# Patient Record
Sex: MALE | Race: WHITE | NOT HISPANIC OR LATINO | Employment: FULL TIME | URBAN - METROPOLITAN AREA
[De-identification: names, ages, dates, MRNs, and addresses within clinical notes are randomized per-mention and may not be internally consistent; named-entity substitution may affect disease eponyms.]

---

## 2019-06-14 ENCOUNTER — EVALUATION (OUTPATIENT)
Dept: PHYSICAL THERAPY | Facility: CLINIC | Age: 24
End: 2019-06-14
Payer: COMMERCIAL

## 2019-06-14 DIAGNOSIS — S83.282D TEAR OF LATERAL MENISCUS OF LEFT KNEE, CURRENT, UNSPECIFIED TEAR TYPE, SUBSEQUENT ENCOUNTER: Primary | ICD-10-CM

## 2019-06-14 PROCEDURE — 97110 THERAPEUTIC EXERCISES: CPT

## 2019-06-14 PROCEDURE — 97161 PT EVAL LOW COMPLEX 20 MIN: CPT

## 2019-06-17 ENCOUNTER — OFFICE VISIT (OUTPATIENT)
Dept: PHYSICAL THERAPY | Facility: CLINIC | Age: 24
End: 2019-06-17
Payer: COMMERCIAL

## 2019-06-17 DIAGNOSIS — S83.282D TEAR OF LATERAL MENISCUS OF LEFT KNEE, CURRENT, UNSPECIFIED TEAR TYPE, SUBSEQUENT ENCOUNTER: Primary | ICD-10-CM

## 2019-06-17 PROCEDURE — 97110 THERAPEUTIC EXERCISES: CPT

## 2019-06-17 PROCEDURE — 97112 NEUROMUSCULAR REEDUCATION: CPT

## 2019-06-21 ENCOUNTER — OFFICE VISIT (OUTPATIENT)
Dept: PHYSICAL THERAPY | Facility: CLINIC | Age: 24
End: 2019-06-21
Payer: COMMERCIAL

## 2019-06-21 DIAGNOSIS — S83.282D TEAR OF LATERAL MENISCUS OF LEFT KNEE, CURRENT, UNSPECIFIED TEAR TYPE, SUBSEQUENT ENCOUNTER: Primary | ICD-10-CM

## 2019-06-21 PROCEDURE — 97110 THERAPEUTIC EXERCISES: CPT

## 2019-06-21 PROCEDURE — 97112 NEUROMUSCULAR REEDUCATION: CPT

## 2019-07-08 ENCOUNTER — OFFICE VISIT (OUTPATIENT)
Dept: PHYSICAL THERAPY | Facility: CLINIC | Age: 24
End: 2019-07-08
Payer: COMMERCIAL

## 2019-07-08 DIAGNOSIS — S83.282D TEAR OF LATERAL MENISCUS OF LEFT KNEE, CURRENT, UNSPECIFIED TEAR TYPE, SUBSEQUENT ENCOUNTER: Primary | ICD-10-CM

## 2019-07-08 PROCEDURE — 97110 THERAPEUTIC EXERCISES: CPT | Performed by: PHYSICAL THERAPIST

## 2019-07-08 PROCEDURE — 97112 NEUROMUSCULAR REEDUCATION: CPT | Performed by: PHYSICAL THERAPIST

## 2019-07-08 NOTE — PROGRESS NOTES
Daily Note     Today's date: 2019  Patient name: Heriberto Basurto  : 1995  MRN: [id number]  Referring provider: Joanna Douglass MD  Dx:   Encounter Diagnosis     ICD-10-CM    1  Tear of lateral meniscus of left knee, current, unspecified tear type, subsequent encounter S84 076D                   Subjective: Pt reports new onset of popping in knee when moving from flexed to fully extended position beginning about 5 days ago  Objective: See treatment diary below; pt 15' late today  Pt was advised he has not attended PT in 2 weeks and is not likely getting the optimal result w/o proper rehab  Precautions: standard    Daily Treatment Diary    There ex: 15 min  Bike L2 7 min  Single leg bridges 2x10 R/L  LE lowering mod 2 x 10  Prone Quad Stretch: 3 x 30" each  HS Stretch: 3 x 30" each   Patellar mobs    NM: 15 min  TB Side stepping: RTB 40'   TB Diagonal Walking: RTB 40'   Squats at rail 2 x 10  Step ups 8 in 2 x 10  Leg Press Nino 70lbs 20x; Unil 40lbs 2 x 10    Precautions: Standard      Assessment: Tolerated treatment well  Patient continues w/ quad weakness likely contributing to "popping" with transitions from flexion to extension in WB (TKE)  He would benefit from more consistent attendance and was advised as such  Plan: Progress treatment as tolerated

## 2019-07-12 ENCOUNTER — OFFICE VISIT (OUTPATIENT)
Dept: PHYSICAL THERAPY | Facility: CLINIC | Age: 24
End: 2019-07-12
Payer: COMMERCIAL

## 2019-07-12 DIAGNOSIS — S83.282D TEAR OF LATERAL MENISCUS OF LEFT KNEE, CURRENT, UNSPECIFIED TEAR TYPE, SUBSEQUENT ENCOUNTER: Primary | ICD-10-CM

## 2019-07-12 PROCEDURE — 97110 THERAPEUTIC EXERCISES: CPT

## 2019-07-12 NOTE — PROGRESS NOTES
Daily Note     Today's date: 2019  Patient name: Marzena Epps  : 1995  MRN: [id number]  Referring provider: Stafford Hodgkin, MD  Dx:   Encounter Diagnosis     ICD-10-CM    1  Tear of lateral meniscus of left knee, current, unspecified tear type, subsequent encounter S83 282D        Start Time: 0900  Stop Time: 0955  Total time in clinic (min): 55 minutes    Subjective:Patient reports his knee continues to feel painful and has popping  Objective: See treatment diary below    Precautions: standard    Daily Treatment Diary    There ex: 15 min  Bike L2 7 min  Single leg bridges 2x10 R/L  LE lowering mod 2 x 10  Prone Quad Stretch: 3 x 30" each  HS Stretch: 3 x 30" each   Patellar mobs    NM: 15 min  TB Side stepping: RTB 40'   TB Diagonal Walking: RTB 40'   Squats at rail 2 x 10  Step ups 8 in 2 x 10  Leg Press Nino 70lbs 20x; Unil 40lbs 2 x 10    Precautions: Standard        Assessment: Tolerated treatment well  Required frequent verbal cues and demonstration to maintain good form with squatting  Patient would benefit from continued PT to address poor stability of L knee contributing to pain and popping/clicking  Plan: Continue per plan of care

## 2019-07-15 ENCOUNTER — OFFICE VISIT (OUTPATIENT)
Dept: PHYSICAL THERAPY | Facility: CLINIC | Age: 24
End: 2019-07-15
Payer: COMMERCIAL

## 2019-07-15 DIAGNOSIS — S83.282D TEAR OF LATERAL MENISCUS OF LEFT KNEE, CURRENT, UNSPECIFIED TEAR TYPE, SUBSEQUENT ENCOUNTER: Primary | ICD-10-CM

## 2019-07-15 PROCEDURE — 97110 THERAPEUTIC EXERCISES: CPT

## 2019-07-15 PROCEDURE — 97112 NEUROMUSCULAR REEDUCATION: CPT

## 2019-07-15 PROCEDURE — 97140 MANUAL THERAPY 1/> REGIONS: CPT

## 2019-07-15 NOTE — PROGRESS NOTES
Daily Note     Today's date: 7/15/2019  Patient name: Celina Singh  : 1995  MRN: [id number]  Referring provider: Moses Salinas MD  Dx:   Encounter Diagnosis     ICD-10-CM    1  Tear of lateral meniscus of left knee, current, unspecified tear type, subsequent encounter K34 145I                   Subjective:Patient reports continued popping in left knee  Denies pain currently      Objective: See treatment diary below    Precautions: standard    Daily Treatment Diary    There ex: 10 min  Bike L2 8  min  Single leg bridges 2x10 R/L  LE lowering mod 2 x 10  Prone Quad Stretch: 3 x 30" each  HS Stretch: 3 x 30" each     NM: 10 min  TB Side stepping: RTB 40'   Squats at rail 2 x 10  Step ups 10 in 2 x 10  Leg Press Nino 70lbs 20x; Unil 40lbs 2 x 10    Manual 10 min  Patellar mobs  ART left quad f/b PROM to increase left knee flex/ext    Precautions: Standard        Assessment: Tolerated treatment well  Required frequent verbal cues and demonstration to maintain good form with squatting  Patient would benefit from continued PT to address poor stability of L knee contributing to pain and popping/clicking  Plan: Continue per plan of care

## 2019-07-17 ENCOUNTER — EVALUATION (OUTPATIENT)
Dept: PHYSICAL THERAPY | Facility: CLINIC | Age: 24
End: 2019-07-17
Payer: COMMERCIAL

## 2019-07-17 DIAGNOSIS — S83.282D TEAR OF LATERAL MENISCUS OF LEFT KNEE, CURRENT, UNSPECIFIED TEAR TYPE, SUBSEQUENT ENCOUNTER: Primary | ICD-10-CM

## 2019-07-17 PROCEDURE — 97140 MANUAL THERAPY 1/> REGIONS: CPT

## 2019-07-17 PROCEDURE — 97110 THERAPEUTIC EXERCISES: CPT

## 2019-07-17 PROCEDURE — 97112 NEUROMUSCULAR REEDUCATION: CPT

## 2019-07-17 NOTE — PROGRESS NOTES
PT Re-Evaluation     Today's date: 2019  Patient name: Debbi Cardona  : 1995  MRN: [id number]  Referring provider: Temo Avelar MD  Dx:   Encounter Diagnosis     ICD-10-CM    1  Tear of lateral meniscus of left knee, current, unspecified tear type, subsequent encounter S83 282D                   Assessment  Assessment details: Debbi Cardona is a 30 y o  male who presents with pain, decreased strength and decreased ROM  Due to these impairments, patient has difficulty performing ADL's, recreational activities, work-related activities, ambulation, stair negotiation  Patient's clinical presentation is consistent with their referring diagnosis of No diagnosis found    Patient has been educated in home exercise program and plan of care  Patient would benefit from skilled physical therapy services to address their aforementioned functional limitations and progress towards prior level of function and independence with home exercise program      Impairments: abnormal muscle firing, abnormal or restricted ROM, activity intolerance, impaired physical strength, lacks appropriate home exercise program and pain with function  Understanding of Dx/Px/POC: good   Prognosis: good    Goals  Short Term Goals: Target Date 30 days  1  Initiate and advance HEP  2  Decrease c/o pain to 4/10 at worst  3  Increase ROM to WNL  4  Increase strength by 1 grade    Long Term Goals: Target Date 90 days  1  Indep with HEP  2  Abolish c/o pain   3   Increase strength to [address]  Patient would benefit from: skilled PT  Planned modality interventions: cryotherapy, electrical stimulation/Russian stimulation and thermotherapy: hydrocollator packs  Planned therapy interventions: joint mobilization, manual therapy, patient education, postural training, activity modification, abdominal trunk stabilization, body mechanics training, flexibility, functional ROM exercises, graded exercise, home exercise program, neuromuscular re-education, strengthening, stretching, therapeutic activities, therapeutic exercise, motor coordination training, muscle pump exercises, gait training, balance/weight bearing training and ADL training  Frequency: 2x week  Treatment plan discussed with: patient        Subjective Evaluation    History of Present Illness  Date of surgery: 2019  Mechanism of injury: surgery  Mechanism of injury: PMH significant for Kidney Transplant, Hearing Loss  S/p left knee arthroscopy 19 due to left lateral meniscal tear, bend over and had sudden onset of left knee locking at 80 degrees, unable to straighten knee  RTW Date :  Pt reports still having stiffness and occasional pain especially at night            Not a recurrent problem   Quality of life: good    Pain  Current pain ratin  At best pain ratin  At worst pain ratin  Location: left lateral knee  Quality: dull ache and radiating  Relieving factors: change in position  Aggravating factors: sitting  Progression: improved    Social Support  Steps to enter house: yes  Stairs in house: yes   Lives in: multiple-level home  Lives with: parents    Employment status: working  Patient Goals  Patient goals for therapy: increased strength, decreased pain and return to work  Patient goal: efficient at work        Objective  Hip  Left  Right  Flexion  4-/5 4/5  Extension 4/5 5/5  Abduction 4-/5 4/5   Knee  Flexion  4-/5 4/5  Extension 3+/5 4/5     Left Knee AROM = -5 to 130  Left Knee PROM = 0 to 135        Daily Treatment Diary    There ex: 15 min  Bike L2 10  min  Single leg bridges 2x10 R/L  LE lowering mod 2 x 10  Prone Quad Stretch: 3 x 30" each  HS Stretch: 3 x 30" each     NM: 15 min  TB Side stepping: RTB 40'   Squats at rail 2 x 10  Step ups 10 in 2 x 10 with 10 lb DB  Leg Press Nino 90lbs 20x;  Unil 40lbs 2 x 10    Manual 10 min  Patellar mobs  ART left quad f/b PROM to increase left knee flex/ext

## 2019-07-18 ENCOUNTER — OFFICE VISIT (OUTPATIENT)
Dept: PHYSICAL THERAPY | Facility: CLINIC | Age: 24
End: 2019-07-18
Payer: COMMERCIAL

## 2019-07-18 DIAGNOSIS — S83.282D TEAR OF LATERAL MENISCUS OF LEFT KNEE, CURRENT, UNSPECIFIED TEAR TYPE, SUBSEQUENT ENCOUNTER: Primary | ICD-10-CM

## 2019-07-18 PROCEDURE — 97110 THERAPEUTIC EXERCISES: CPT

## 2019-07-18 PROCEDURE — 97140 MANUAL THERAPY 1/> REGIONS: CPT

## 2019-07-18 NOTE — PROGRESS NOTES
Daily Note     Today's date: 2019  Patient name: Seth Echavarria  : 1995  MRN: [id number]  Referring provider: Des Lee MD  Dx:   Encounter Diagnosis     ICD-10-CM    1  Tear of lateral meniscus of left knee, current, unspecified tear type, subsequent encounter S83 282D        Start Time: 0800  Stop Time: 0900  Total time in clinic (min): 60 minutes    Subjective: Patient reports that since last session his knee feels better  He really feels the stretching was helpful  Objective: See treatment diary below  Daily Treatment Diary    There ex: 15 min  Bike L2 10  min  Single leg bridges 2x10 R/L  LE lowering mod 2 x 10  Prone Quad Stretch: 3 x 30" each    NM:  TB Side Step with squat: GTB 40'  TB diagonal walking forward and back: GTB 36'   Squats at rail 2 x 10  Step ups 10 in 2 x 10 with 10 lb DB  Leg Press Nino with TB Abduction blue 90lbs 20x; Unil 40lbs 2 x 10 each    Manual   Patellar mobs  ART left quad f/b PROM to increase left knee flex/ext    Not Today  HS Stretch: 3 x 30" each     Assessment: Tolerated treatment well  Patient would benefit from continued PT      Plan: Continue per plan of care

## 2019-07-19 ENCOUNTER — APPOINTMENT (OUTPATIENT)
Dept: PHYSICAL THERAPY | Facility: CLINIC | Age: 24
End: 2019-07-19
Payer: COMMERCIAL

## 2019-07-22 ENCOUNTER — OFFICE VISIT (OUTPATIENT)
Dept: PHYSICAL THERAPY | Facility: CLINIC | Age: 24
End: 2019-07-22
Payer: COMMERCIAL

## 2019-07-22 DIAGNOSIS — S83.282D TEAR OF LATERAL MENISCUS OF LEFT KNEE, CURRENT, UNSPECIFIED TEAR TYPE, SUBSEQUENT ENCOUNTER: Primary | ICD-10-CM

## 2019-07-22 PROCEDURE — 97110 THERAPEUTIC EXERCISES: CPT

## 2019-07-22 PROCEDURE — 97112 NEUROMUSCULAR REEDUCATION: CPT

## 2019-07-22 PROCEDURE — 97140 MANUAL THERAPY 1/> REGIONS: CPT

## 2019-07-22 NOTE — PROGRESS NOTES
Daily Note     Today's date: 2019  Patient name: Merline Stack  : 1995  MRN: [id number]  Referring provider: Lynn Francois MD  Dx:   Encounter Diagnosis     ICD-10-CM    1  Tear of lateral meniscus of left knee, current, unspecified tear type, subsequent encounter S83 282D        Start Time: 1505  Stop Time: 1600  Total time in clinic (min): 55 minutes    Subjective: Patient reports that his R knee has been clicking and popping, and is sometimes painful  His L knee feels like it is getting stronger and more stable  Objective: See treatment diary below  Daily Treatment Diary    There ex: 15 min  Bike L2 5 min  Single leg bridges 2x10 R/L  LE lowering mod 2 x 10  Prone Quad Stretch: 3 x 30" each  Dynamic Stretching: [place] Kicks 40', knee hugs 40'  NM:  TB Side Step with squat: Blue TB 40'  TB diagonal walking forward and back: Blue TB 40'   Squats at rail on BOSU 2 x 10  Step ups 10 in 2 x 10 with 15 lb DB  Unil 30lbs 2 x 10 each    Manual   Patellar mobs  ART left quad f/b PROM to increase left knee flex/ext  IASTM: L quadriceps patient seated       Not Today  HS Stretch: 3 x 30" each   Leg Press Nino with TB Abduction blue 90lbs 20x; Assessment: Tolerated treatment well  Added dynamic stretching exercises to address poor balance and flexibility  Plan: Continue per plan of care

## 2019-07-24 ENCOUNTER — OFFICE VISIT (OUTPATIENT)
Dept: PHYSICAL THERAPY | Facility: CLINIC | Age: 24
End: 2019-07-24
Payer: COMMERCIAL

## 2019-07-24 DIAGNOSIS — S83.282D TEAR OF LATERAL MENISCUS OF LEFT KNEE, CURRENT, UNSPECIFIED TEAR TYPE, SUBSEQUENT ENCOUNTER: Primary | ICD-10-CM

## 2019-07-24 PROCEDURE — 97140 MANUAL THERAPY 1/> REGIONS: CPT

## 2019-07-24 PROCEDURE — 97110 THERAPEUTIC EXERCISES: CPT

## 2019-07-24 NOTE — PROGRESS NOTES
Daily Note     Today's date: 2019  Patient name: Sarita Dubon  : 1995  MRN: [id number]  Referring provider: Goran Ricardo MD  Dx:   Encounter Diagnosis     ICD-10-CM    1  Tear of lateral meniscus of left knee, current, unspecified tear type, subsequent encounter S83 282D        Start Time: 1500  Stop Time: 1600  Total time in clinic (min): 60 minutes    Subjective:  Patient reports that his L and R knees are painful and clicking still  Objective: See treatment diary below    Daily Treatment Diary    There ex: 15 min  Bike L2 10 min  Prone Quad Stretch: 3 x 30" each  Dynamic Stretching: [place] Kicks 36', knee hugs 40'  NM:  TB Side Step with squat: Blue TB 40'  TB diagonal walking forward and back: Blue TB 36'   Squat with OH Press: 10# 2 x 10   SLS with Hip Extension: [place] 6# 2 x 10 each  SLS with Hip Abduction : [place] 6# 2 x 10 each  Manual   Patellar mobs  ART left quad f/b PROM to increase left knee flex/ext  IASTM: L quadriceps patient seated       Not Today  HS Stretch: 3 x 30" each   Leg Press Nino with TB Abduction blue 90lbs 20x; Assessment: Tolerated treatment well  Patient would benefit from continued PT      Plan: Continue per plan of care

## 2019-07-25 ENCOUNTER — OFFICE VISIT (OUTPATIENT)
Dept: PHYSICAL THERAPY | Facility: CLINIC | Age: 24
End: 2019-07-25
Payer: COMMERCIAL

## 2019-07-25 DIAGNOSIS — S83.282D TEAR OF LATERAL MENISCUS OF LEFT KNEE, CURRENT, UNSPECIFIED TEAR TYPE, SUBSEQUENT ENCOUNTER: Primary | ICD-10-CM

## 2019-07-25 PROCEDURE — 97140 MANUAL THERAPY 1/> REGIONS: CPT

## 2019-07-25 PROCEDURE — 97110 THERAPEUTIC EXERCISES: CPT

## 2019-07-25 NOTE — PROGRESS NOTES
Daily Note     Today's date: 2019  Patient name: Rodriguez Bennett  : 1995  MRN: [id number]  Referring provider: Oscar Astorga MD  Dx:   Encounter Diagnosis     ICD-10-CM    1  Tear of lateral meniscus of left knee, current, unspecified tear type, subsequent encounter S83 282D        Start Time: 1500  Stop Time: 1605  Total time in clinic (min): 65 minutes    Subjective:  Patient reports "clicking" in back of knee when working out  He reports he experiences pain at night and need to ice  Objective: See treatment diary below    Daily Treatment Diary    There ex: 15 min  Bike L2 10 min  Prone Quad Stretch: 3 x 30" each  Dynamic Stretching: [place] Kicks 36', knee hugs 40'  Leg Press Nino with TB Abduction blue 90lbs 20x    NM:  TB Side Step with squat: Blue TB 40' around knees  TB diagonal walking forward and back: Blue TB 40'   Squat to low table: 20# 2 x 10   SLS with Hip Extension: [place] 6# 2 x 10 each  SLS with Hip Abduction : [place] 6# 2 x 10 each    Manual   IASTM: L quadriceps/L HS with roller  HS str: R 5x20s     Modalities: Ice 5' post         Assessment: Tolerated treatment well  Patient would benefit from continued PT  Pt demo good activity tolerance during session, he did not report any pain during session  Plan: Continue per plan of care

## 2019-07-26 ENCOUNTER — APPOINTMENT (OUTPATIENT)
Dept: PHYSICAL THERAPY | Facility: CLINIC | Age: 24
End: 2019-07-26
Payer: COMMERCIAL

## 2019-07-29 ENCOUNTER — OFFICE VISIT (OUTPATIENT)
Dept: PHYSICAL THERAPY | Facility: CLINIC | Age: 24
End: 2019-07-29
Payer: COMMERCIAL

## 2019-07-29 DIAGNOSIS — S83.282D TEAR OF LATERAL MENISCUS OF LEFT KNEE, CURRENT, UNSPECIFIED TEAR TYPE, SUBSEQUENT ENCOUNTER: Primary | ICD-10-CM

## 2019-07-29 PROCEDURE — 97112 NEUROMUSCULAR REEDUCATION: CPT

## 2019-07-29 PROCEDURE — 97110 THERAPEUTIC EXERCISES: CPT

## 2019-07-29 PROCEDURE — 97140 MANUAL THERAPY 1/> REGIONS: CPT

## 2019-07-29 NOTE — PROGRESS NOTES
Daily Note     Today's date: 2019  Patient name: Machelle Amado  : 1995  MRN: [id number]  Referring provider: Rhonda Abel MD  Dx:   Encounter Diagnosis     ICD-10-CM    1  Tear of lateral meniscus of left knee, current, unspecified tear type, subsequent encounter S83 644D                   Subjective:  Patient reports bad pain in posterior left knee for past few days     Objective: See treatment diary below    Daily Treatment Diary    There ex: 10 min  Bike L2 10 min  Prone Quad Stretch: 3 x 30" each  Hip flexor stretch 3 x 30 sec  HS Stretch 3 x 30 sec    NM: 15 min  SLS Deadlift on foam 2 x 10  Step ups with SL balance 2 x 10 10 in  Squats 2 x 10    Manual 10 min  ART right Popliteus  PROM to increase knee flexion  Patellar mobs    Modalities: Ice 5' post     Assessment: Tolerated treatment well  Patient would benefit from continued PT  Pt had moderate tenderness to palpation of left poplitues, reported decreased pain after manual therapy    Plan: Continue per plan of care

## 2019-07-31 ENCOUNTER — OFFICE VISIT (OUTPATIENT)
Dept: PHYSICAL THERAPY | Facility: CLINIC | Age: 24
End: 2019-07-31
Payer: COMMERCIAL

## 2019-07-31 DIAGNOSIS — S83.282D TEAR OF LATERAL MENISCUS OF LEFT KNEE, CURRENT, UNSPECIFIED TEAR TYPE, SUBSEQUENT ENCOUNTER: Primary | ICD-10-CM

## 2019-07-31 PROCEDURE — 97140 MANUAL THERAPY 1/> REGIONS: CPT

## 2019-07-31 PROCEDURE — 97110 THERAPEUTIC EXERCISES: CPT

## 2019-07-31 PROCEDURE — 97112 NEUROMUSCULAR REEDUCATION: CPT

## 2019-07-31 NOTE — PROGRESS NOTES
Daily Note     Today's date: 2019  Patient name: Merlin Hails  : 1995  MRN: [id number]  Referring provider: Basim Cesar MD  Dx:   Encounter Diagnosis     ICD-10-CM    1  Tear of lateral meniscus of left knee, current, unspecified tear type, subsequent encounter P02 089K                   Subjective:  Patient reports feeling better than last session, reports pain = 6/10 at worst after increased activity  Usually at night is the worst, ice has been helping     Objective: See treatment diary below    Daily Treatment Diary    There ex: 10 min  Bike L2 10 min  Prone Quad Stretch: 3 x 30" each  Hip flexor stretch 3 x 30 sec  HS Stretch 3 x 30 sec    NM: 15 min  SLS Deadlift on foam 2 x 10  Step ups with SL balance 2 x 10 10 in  Squats 2 x 10    Manual 10 min  ART right Popliteus  PROM to increase knee flexion  Patellar mobs    Modalities: Ice 5' post     Assessment: Tolerated treatment well  Patient would benefit from continued PT  Plan: Continue per plan of care

## 2019-08-01 ENCOUNTER — OFFICE VISIT (OUTPATIENT)
Dept: PHYSICAL THERAPY | Facility: CLINIC | Age: 24
End: 2019-08-01
Payer: COMMERCIAL

## 2019-08-01 DIAGNOSIS — S83.282D TEAR OF LATERAL MENISCUS OF LEFT KNEE, CURRENT, UNSPECIFIED TEAR TYPE, SUBSEQUENT ENCOUNTER: Primary | ICD-10-CM

## 2019-08-01 PROCEDURE — 97140 MANUAL THERAPY 1/> REGIONS: CPT

## 2019-08-01 PROCEDURE — 97110 THERAPEUTIC EXERCISES: CPT

## 2019-08-01 NOTE — PROGRESS NOTES
Daily Note     Today's date: 2019  Patient name: Heriberto Basurto  : 1995  MRN: [id number]  Referring provider: Joanna Douglass MD  Dx:   Encounter Diagnosis     ICD-10-CM    1  Tear of lateral meniscus of left knee, current, unspecified tear type, subsequent encounter S83 282D        Start Time: 1500  Stop Time: 1605  Total time in clinic (min): 65 minutes    Subjective: Patient reports that next visit he wants to try electrical stimulation to see if it helps his pain levels  Objective: See treatment diary below      Daily Treatment Diary    There ex:   Bike L2 10 min  Prone Quad Stretch: 3 x 30" each  Dynamic stretching: knee hugs 40' x 2  Dynamic stretching: [place] kicks 40' x 2      NM:   SLS Deadlift on foam 2 x 10- 1 UE support and PT verbal cues/demonstration  TB side stepping with squats: GTB 40' x2  Squats 2 x 10  TB diagonal walking GTB 40' x2  Manual 10 min  ART right Popliteus  PROM to increase knee flexion  Patellar mobs    Modalities: Ice 5' post    Not Today:  Step ups with SL balance 2 x 10 10 in    Assessment: Tolerated treatment well  Patient would benefit from continued PT      Plan: Continue per plan of care

## 2019-08-05 ENCOUNTER — OFFICE VISIT (OUTPATIENT)
Dept: PHYSICAL THERAPY | Facility: CLINIC | Age: 24
End: 2019-08-05
Payer: COMMERCIAL

## 2019-08-05 DIAGNOSIS — S83.282D TEAR OF LATERAL MENISCUS OF LEFT KNEE, CURRENT, UNSPECIFIED TEAR TYPE, SUBSEQUENT ENCOUNTER: Primary | ICD-10-CM

## 2019-08-05 PROCEDURE — 97140 MANUAL THERAPY 1/> REGIONS: CPT

## 2019-08-05 PROCEDURE — 97112 NEUROMUSCULAR REEDUCATION: CPT

## 2019-08-05 PROCEDURE — 97110 THERAPEUTIC EXERCISES: CPT

## 2019-08-05 NOTE — PROGRESS NOTES
Daily Note     Today's date: 2019  Patient name: Devin Andre  : 1995  MRN: [id number]  Referring provider: Daisy Quiñonez MD  Dx:   Encounter Diagnosis     ICD-10-CM    1  Tear of lateral meniscus of left knee, current, unspecified tear type, subsequent encounter P05 311D                   Subjective: Patient reports no new c/o      Objective: See treatment diary below    Daily Treatment Diary    There ex: 10 min  Bike L2 10 min  Prone Quad Stretch: 3 x 30" each  Nino LE stretching    NM: 20 min  SLS Deadlift on foam 2 x 10- 1 UE support and PT verbal cues/demonstration  TB side stepping with squats: GTB 40' x2  Squats 2 x 10  Step ups with SL balance 2 x 10 10 in    Manual 10 min  ART right Popliteus  PROM to increase knee flexion  Patellar mobs    Modalities:Pre mod stim with CP x 10 min    Assessment: Tolerated treatment well  Patient would benefit from continued PT    Plan: Continue per plan of care

## 2019-08-07 ENCOUNTER — OFFICE VISIT (OUTPATIENT)
Dept: PHYSICAL THERAPY | Facility: CLINIC | Age: 24
End: 2019-08-07
Payer: COMMERCIAL

## 2019-08-07 DIAGNOSIS — S83.282D TEAR OF LATERAL MENISCUS OF LEFT KNEE, CURRENT, UNSPECIFIED TEAR TYPE, SUBSEQUENT ENCOUNTER: Primary | ICD-10-CM

## 2019-08-07 PROCEDURE — 97112 NEUROMUSCULAR REEDUCATION: CPT

## 2019-08-07 PROCEDURE — 97140 MANUAL THERAPY 1/> REGIONS: CPT

## 2019-08-07 PROCEDURE — 97110 THERAPEUTIC EXERCISES: CPT

## 2019-08-08 ENCOUNTER — OFFICE VISIT (OUTPATIENT)
Dept: PHYSICAL THERAPY | Facility: CLINIC | Age: 24
End: 2019-08-08
Payer: COMMERCIAL

## 2019-08-08 DIAGNOSIS — S83.282D TEAR OF LATERAL MENISCUS OF LEFT KNEE, CURRENT, UNSPECIFIED TEAR TYPE, SUBSEQUENT ENCOUNTER: Primary | ICD-10-CM

## 2019-08-08 PROCEDURE — 97014 ELECTRIC STIMULATION THERAPY: CPT | Performed by: PHYSICAL THERAPIST

## 2019-08-08 PROCEDURE — 97112 NEUROMUSCULAR REEDUCATION: CPT | Performed by: PHYSICAL THERAPIST

## 2019-08-08 PROCEDURE — 97110 THERAPEUTIC EXERCISES: CPT | Performed by: PHYSICAL THERAPIST

## 2019-08-08 PROCEDURE — 97140 MANUAL THERAPY 1/> REGIONS: CPT | Performed by: PHYSICAL THERAPIST

## 2019-08-12 ENCOUNTER — OFFICE VISIT (OUTPATIENT)
Dept: PHYSICAL THERAPY | Facility: CLINIC | Age: 24
End: 2019-08-12
Payer: COMMERCIAL

## 2019-08-12 DIAGNOSIS — S83.282D TEAR OF LATERAL MENISCUS OF LEFT KNEE, CURRENT, UNSPECIFIED TEAR TYPE, SUBSEQUENT ENCOUNTER: Primary | ICD-10-CM

## 2019-08-12 PROCEDURE — 97140 MANUAL THERAPY 1/> REGIONS: CPT | Performed by: PHYSICAL THERAPIST

## 2019-08-12 PROCEDURE — 97110 THERAPEUTIC EXERCISES: CPT | Performed by: PHYSICAL THERAPIST

## 2019-08-12 PROCEDURE — 97112 NEUROMUSCULAR REEDUCATION: CPT | Performed by: PHYSICAL THERAPIST

## 2019-08-12 NOTE — PROGRESS NOTES
Daily Note     Today's date: 2019  Patient name: Lizet Ross  : 1995  MRN: [id number]  Referring provider: Madelyn Kaur MD  Dx:   Encounter Diagnosis     ICD-10-CM    1  Tear of lateral meniscus of left knee, current, unspecified tear type, subsequent encounter Z38 007L                   Subjective: Pt reports his left knee still "pops" in the back of knee but feeling less pain overall    Objective: See treatment diary below; review of squat form w/ pt    Assessment: Tolerated treatment well  Plan: Continue per plan of care        Daily Treatment Diary    There ex: 10 min  Bike L2 10 min  Uni leg press supine 55# 2x10 R/L  Semi-Prone Quad Stretch: 3 x 30" each  Nino LE stretching    NM: 10 min  Stationary lunges 1x10 each  SLS Deadlift on foam 2 x 10- 1 UE support and PT verbal cues/demonstration  TB side stepping with squats: GTB 40' x2 & 10# DB  Squats 2 x 10  Step ups with SL balance 2 x 10 10# DB OH; 8"+2" foam    Manual 10 min   ART right Popliteus  PROM to increase knee flexion  Patellar mobs    Modalities:Pre mod stim with CP x 10 min

## 2019-08-14 ENCOUNTER — EVALUATION (OUTPATIENT)
Dept: PHYSICAL THERAPY | Facility: CLINIC | Age: 24
End: 2019-08-14
Payer: COMMERCIAL

## 2019-08-14 DIAGNOSIS — S83.282D TEAR OF LATERAL MENISCUS OF LEFT KNEE, CURRENT, UNSPECIFIED TEAR TYPE, SUBSEQUENT ENCOUNTER: Primary | ICD-10-CM

## 2019-08-14 PROCEDURE — 97110 THERAPEUTIC EXERCISES: CPT

## 2019-08-14 PROCEDURE — 97140 MANUAL THERAPY 1/> REGIONS: CPT

## 2019-08-14 NOTE — LETTER
2019    Debbie Duenas MD  [address] [place] [address]    Patient: Shahida Hidalgo   YOB: 1995   Date of Visit: 2019     Encounter Diagnosis     ICD-10-CM    1  Tear of lateral meniscus of left knee, current, unspecified tear type, subsequent encounter S83 282D        Dear Dr Juan Jose Iglesiaser:    Thank you for your recent referral of Shahida Hidalgo  Please review the attached evaluation summary from Humberto's recent visit  Please verify that you agree with the plan of care by signing the attached order  If you have any questions or concerns, please do not hesitate to call  I sincerely appreciate the opportunity to share in the care of one of your patients and hope to have another opportunity to work with you in the near future  Sincerely,    Fadi Malik, PT      Referring Provider:      I certify that I have read the below Plan of Care and certify the need for these services furnished under this plan of treatment while under my care  Debbie Duenas MD  [address]: [phone]          PT Re-Evaluation     Today's date: 2019  Patient name: Shahida Hidalgo  : 1995  MRN: [id number]  Referring provider: Marina Allen MD  Dx:   Encounter Diagnosis     ICD-10-CM    1  Tear of lateral meniscus of left knee, current, unspecified tear type, subsequent encounter S83 282D        Start Time: 1500  Stop Time: 1610  Total time in clinic (min): 70 minutes    Assessment  Assessment details: Shahida Hidalgo is a 30 y o  male who presents with pain, decreased strength and decreased ROM  Due to these impairments, patient has difficulty performing ADL's, recreational activities, work-related activities, ambulation, stair negotiation  Patient's clinical presentation is consistent with their referring diagnosis of No diagnosis found    Patient has been educated in home exercise program and plan of care  Patient would benefit from skilled physical therapy services to address their aforementioned functional limitations and progress towards prior level of function and independence with home exercise program        8/14/19  Patient states that he has improved by 75%  Continues to have painful popping reaching 6/10 at worst when squatting down or picking something up  Improvements in AROM/PROM compared to last PN  Improvements in strength BL but continues to have poor hip stability  Functional strength deficits still present requiring PT verbal cues and demonstration to complete lunges and SL deadlifts  Due to these impairments, patient has difficulty performing ADL's, recreational activities, work-related activities, ambulation, stair negotiation  Patient's clinical presentation is consistent with their referring diagnosis of No diagnosis found    Patient has been educated in home exercise program and plan of care  Patient would benefit from skilled physical therapy services to address their aforementioned functional limitations and progress towards prior level of function and independence with home exercise program   Impairments: abnormal muscle firing, abnormal or restricted ROM, activity intolerance, impaired physical strength, lacks appropriate home exercise program and pain with function  Understanding of Dx/Px/POC: good   Prognosis: good    Goals  Short Term Goals: Target Date 30 days  1  Initiate and advance HEP-partially met  2  Decrease c/o pain to 4/10 at worst-not met  3  Increase ROM to WNL- met  4  Increase strength by 1 grade- met    Long Term Goals: Target Date 90 days  1  Indep with HEP-mostly met  2  Abolish c/o pain - not met  3   Increase strength to [facility]- partially met      Plan  Patient would benefit from: skilled PT  Planned modality interventions: cryotherapy, electrical stimulation/Russian stimulation and thermotherapy: hydrocollator packs  Planned therapy interventions: joint mobilization, manual therapy, patient education, postural training, activity modification, abdominal trunk stabilization, body mechanics training, flexibility, functional ROM exercises, graded exercise, home exercise program, neuromuscular re-education, strengthening, stretching, therapeutic activities, therapeutic exercise, motor coordination training, muscle pump exercises, gait training, balance/weight bearing training and ADL training  Frequency: 3x week  Duration in weeks: 6  Plan of Care beginning date: 2019  Plan of Care expiration date: 2019  Treatment plan discussed with: patient        Subjective Evaluation    History of Present Illness  Date of surgery: 2019  Mechanism of injury: surgery  Mechanism of injury: PMH significant for Kidney Transplant, Hearing Loss  S/p left knee arthroscopy 19 due to left lateral meniscal tear, bend over and had sudden onset of left knee locking at 80 degrees, unable to straighten knee  RTW Date :  Pt reports still having stiffness and occasional pain especially at night    19  Patient reports that since beginning PT he has improved by 75%  He has more mobility and strength, but still has popping and pain while bending/squatting  Pain is at worst /10 when popping             Not a recurrent problem   Quality of life: good    Pain  Current pain ratin  At best pain ratin  At worst pain ratin  Location: left lateral knee  Quality: dull ache and radiating  Relieving factors: change in position  Aggravating factors: sitting  Progression: improved    Social Support  Steps to enter house: yes  Stairs in house: yes   Lives in: multiple-level home  Lives with: parents    Employment status: working  Patient Goals  Patient goals for therapy: increased strength, decreased pain and return to work  Patient goal: efficient at work        Objective  Hip  Left Right  Flexion  4/5 4/5  Extension 4/5 5/5  Abduction 4+/5 4/5   Knee  Flexion  4+/5 4/5  Extension 4+/5 4/5     Left Knee AROM = -5 to 135  Left Knee PROM = 0 to 140        Daily Treatment Diary      Manual Tx:  IASTM: popliteus, gastrocs, soleus  ART: HS and gastrocs  TE's:  Upright Bike: L2 10'  Prone Quad Stretch: 6 x 10"  Strength/ROM Testin'  FOTO Review: 6'  SL Leg Press: 55# 2 x 10   Static Lunges with 1 UE support: 1 x 10 each requiring PT verbal cues and demonstration  Squats on Wobble Board:  1 x 10 each requiring PT verbal cues and demonstration  Modalities:  CP Post tx to L knee: 10' skin in tact pre and post  TENS   CP Post tx to L knee: 10' skin in tact pre and post

## 2019-08-14 NOTE — PROGRESS NOTES
PT Re-Evaluation     Today's date: 2019  Patient name: Seth Echavarria  : 1995  MRN: [id number]  Referring provider: Des Lee MD  Dx:   Encounter Diagnosis     ICD-10-CM    1  Tear of lateral meniscus of left knee, current, unspecified tear type, subsequent encounter S83 282D        Start Time: 1500  Stop Time: 1610  Total time in clinic (min): 70 minutes    Assessment  Assessment details: Seth Echavarria is a 30 y o  male who presents with pain, decreased strength and decreased ROM  Due to these impairments, patient has difficulty performing ADL's, recreational activities, work-related activities, ambulation, stair negotiation  Patient's clinical presentation is consistent with their referring diagnosis of No diagnosis found    Patient has been educated in home exercise program and plan of care  Patient would benefit from skilled physical therapy services to address their aforementioned functional limitations and progress towards prior level of function and independence with home exercise program        19  Patient states that he has improved by 75%  Continues to have painful popping reaching 6/10 at worst when squatting down or picking something up  Improvements in AROM/PROM compared to last PN  Improvements in strength BL but continues to have poor hip stability  Functional strength deficits still present requiring PT verbal cues and demonstration to complete lunges and SL deadlifts  Due to these impairments, patient has difficulty performing ADL's, recreational activities, work-related activities, ambulation, stair negotiation  Patient's clinical presentation is consistent with their referring diagnosis of No diagnosis found    Patient has been educated in home exercise program and plan of care   Patient would benefit from skilled physical therapy services to address their aforementioned functional limitations and progress towards prior level of function and independence with home exercise program   Impairments: abnormal muscle firing, abnormal or restricted ROM, activity intolerance, impaired physical strength, lacks appropriate home exercise program and pain with function  Understanding of Dx/Px/POC: good   Prognosis: good    Goals  Short Term Goals: Target Date 30 days  1  Initiate and advance HEP-partially met  2  Decrease c/o pain to 4/10 at worst-not met  3  Increase ROM to WNL- met  4  Increase strength by 1 grade- met    Long Term Goals: Target Date 90 days  1  Indep with HEP-mostly met  2  Abolish c/o pain - not met  3  Increase strength to [facility]- partially met      Plan  Patient would benefit from: skilled PT  Planned modality interventions: cryotherapy, electrical stimulation/Russian stimulation and thermotherapy: hydrocollator packs  Planned therapy interventions: joint mobilization, manual therapy, patient education, postural training, activity modification, abdominal trunk stabilization, body mechanics training, flexibility, functional ROM exercises, graded exercise, home exercise program, neuromuscular re-education, strengthening, stretching, therapeutic activities, therapeutic exercise, motor coordination training, muscle pump exercises, gait training, balance/weight bearing training and ADL training  Frequency: 3x week  Duration in weeks: 6  Plan of Care beginning date: 8/14/2019  Plan of Care expiration date: 9/25/2019  Treatment plan discussed with: patient        Subjective Evaluation    History of Present Illness  Date of surgery: 5/1/2019  Mechanism of injury: surgery  Mechanism of injury: PMH significant for Kidney Transplant, Hearing Loss  S/p left knee arthroscopy 5/1/19 due to left lateral meniscal tear, bend over and had sudden onset of left knee locking at 80 degrees, unable to straighten knee   RTW Date Sept 5th 717/19:  Pt reports still having stiffness and occasional pain especially at night    8/14/19  Patient reports that since beginning PT he has improved by 75%  He has more mobility and strength, but still has popping and pain while bending/squatting  Pain is at worst 6/10 when popping             Not a recurrent problem   Quality of life: good    Pain  Current pain ratin  At best pain ratin  At worst pain ratin  Location: left lateral knee  Quality: dull ache and radiating  Relieving factors: change in position  Aggravating factors: sitting  Progression: improved    Social Support  Steps to enter house: yes  Stairs in house: yes   Lives in: multiple-level home  Lives with: parents    Employment status: working  Patient Goals  Patient goals for therapy: increased strength, decreased pain and return to work  Patient goal: efficient at work        Objective  Hip  Left  Right  Flexion  4/5 4/5  Extension 4/5 5/5  Abduction 4+/5 4/5   Knee  Flexion  4+/5 4/5  Extension 4+/5 4/5     Left Knee AROM = -5 to 135  Left Knee PROM = 0 to 140        Daily Treatment Diary      Manual Tx:  IASTM: popliteus, gastrocs, soleus  ART: HS and gastrocs  TE's:  Upright Bike: L2 10'  Prone Quad Stretch: 6 x 10"  Strength/ROM Testin'  FOTO Review: 6'  SL Leg Press: 55# 2 x 10   Static Lunges with 1 UE support: 1 x 10 each requiring PT verbal cues and demonstration  Squats on Wobble Board:  1 x 10 each requiring PT verbal cues and demonstration  Modalities:  CP Post tx to L knee: 10' skin in tact pre and post  TENS   CP Post tx to L knee: 10' skin in tact pre and post

## 2019-08-15 ENCOUNTER — OFFICE VISIT (OUTPATIENT)
Dept: PHYSICAL THERAPY | Facility: CLINIC | Age: 24
End: 2019-08-15
Payer: COMMERCIAL

## 2019-08-15 DIAGNOSIS — S83.282D TEAR OF LATERAL MENISCUS OF LEFT KNEE, CURRENT, UNSPECIFIED TEAR TYPE, SUBSEQUENT ENCOUNTER: Primary | ICD-10-CM

## 2019-08-15 PROCEDURE — 97110 THERAPEUTIC EXERCISES: CPT

## 2019-08-15 PROCEDURE — 97140 MANUAL THERAPY 1/> REGIONS: CPT

## 2019-08-15 PROCEDURE — 97112 NEUROMUSCULAR REEDUCATION: CPT

## 2019-08-15 NOTE — PROGRESS NOTES
Daily Note     Today's date: 8/15/2019  Patient name: Prerna Whiteside  : 1995  MRN: [id number]  Referring provider: Kevyn Rangel MD  Dx:   Encounter Diagnosis     ICD-10-CM    1  Tear of lateral meniscus of left knee, current, unspecified tear type, subsequent encounter O97 998D                   Subjective: Pt reports his left knee is sore on superior lateral patella for past few days  Also reports his right knee is bothering him    Objective: See treatment diary below    Assessment: Tolerated treatment well  Plan: Continue per plan of care        Daily Treatment Diary    There ex: 10 min  Bike L2 10 min  Uni leg press supine 55# 2x10 R/L  Semi-Prone Quad Stretch: 3 x 30" each  Nino LE stretching    NM: 10 min  Stationary lunges 1x10 each  SLS Deadlift on foam 2 x 10- 1 UE support and PT verbal cues/demonstration  TB side stepping with squats: GTB 40' x2 & 10# DB  Squats 2 x 10  Step ups with SL balance 2 x 10 10# DB OH; 8"+2" foam    Manual 10 min   ART right quad  PROM to increase knee flexion  Patellar mobs    Modalities:Pre mod stim with CP x 10 min

## 2019-08-19 ENCOUNTER — APPOINTMENT (OUTPATIENT)
Dept: PHYSICAL THERAPY | Facility: CLINIC | Age: 24
End: 2019-08-19
Payer: COMMERCIAL

## 2019-08-21 ENCOUNTER — APPOINTMENT (OUTPATIENT)
Dept: PHYSICAL THERAPY | Facility: CLINIC | Age: 24
End: 2019-08-21
Payer: COMMERCIAL

## 2019-08-22 ENCOUNTER — OFFICE VISIT (OUTPATIENT)
Dept: PHYSICAL THERAPY | Facility: CLINIC | Age: 24
End: 2019-08-22
Payer: COMMERCIAL

## 2019-08-22 DIAGNOSIS — S83.282D TEAR OF LATERAL MENISCUS OF LEFT KNEE, CURRENT, UNSPECIFIED TEAR TYPE, SUBSEQUENT ENCOUNTER: Primary | ICD-10-CM

## 2019-08-22 PROCEDURE — 97014 ELECTRIC STIMULATION THERAPY: CPT

## 2019-08-22 PROCEDURE — 97140 MANUAL THERAPY 1/> REGIONS: CPT

## 2019-08-22 PROCEDURE — 97110 THERAPEUTIC EXERCISES: CPT

## 2019-08-22 PROCEDURE — 97112 NEUROMUSCULAR REEDUCATION: CPT

## 2019-08-22 NOTE — PROGRESS NOTES
Daily Note     Today's date: 2019  Patient name: Lizzeth Ladd  : 1995  MRN: [id number]  Referring provider: Ailin Lopez MD  Dx:   Encounter Diagnosis     ICD-10-CM    1  Tear of lateral meniscus of left knee, current, unspecified tear type, subsequent encounter P97 341T                   Subjective: Pt reports saw MD, has new Rx for right knee  Anxious about lifting 75-100lbs for work    Objective: See treatment diary below    Assessment: Tolerated treatment well  Plan: Continue per plan of care        Daily Treatment Diary    There ex: 10 min  Bike L2 10 min  Uni leg press supine 55# 2x10 R/L  Semi-Prone Quad Stretch: 3 x 30" each  Nino LE stretching    NM: 10 min  Stationary lunges 1x10 each  SLS Deadlift on foam 2 x 10-   TB side stepping with squats: GTB 40' x2 & 10# DB  Squats 2 x 10 with 25lb DB  Step ups with SL balance 2 x 10 10# DB OH    Manual 10 min   ART right quad/patellar tendon  PROM to increase knee flexion  Patellar mobs    Modalities:Pre mod stim with CP x 8 min

## 2019-08-26 ENCOUNTER — APPOINTMENT (OUTPATIENT)
Dept: PHYSICAL THERAPY | Facility: CLINIC | Age: 24
End: 2019-08-26
Payer: COMMERCIAL

## 2019-08-28 ENCOUNTER — OFFICE VISIT (OUTPATIENT)
Dept: PHYSICAL THERAPY | Facility: CLINIC | Age: 24
End: 2019-08-28
Payer: COMMERCIAL

## 2019-08-28 DIAGNOSIS — S83.282D TEAR OF LATERAL MENISCUS OF LEFT KNEE, CURRENT, UNSPECIFIED TEAR TYPE, SUBSEQUENT ENCOUNTER: Primary | ICD-10-CM

## 2019-08-28 PROCEDURE — 97140 MANUAL THERAPY 1/> REGIONS: CPT

## 2019-08-28 PROCEDURE — 97110 THERAPEUTIC EXERCISES: CPT

## 2019-08-28 NOTE — PROGRESS NOTES
Daily Note     Today's date: 2019  Patient name: Anthony De La Fuente  : 1995  MRN: [id number]  Referring provider: Daksha Aldana MD  Dx:   Encounter Diagnosis     ICD-10-CM    1  Tear of lateral meniscus of left knee, current, unspecified tear type, subsequent encounter S83 282D        Start Time: 1500  Stop Time: 1600  Total time in clinic (min): 60 minutes    Subjective: Patient reports that he continues to have painful popping in his R knee  Objective: See treatment diary below      Assessment: Tolerated treatment well  Continues to show poor form with functional strengthening exercises such as lunges and SL dead lifts requiring PT verbal cues and demonstration  Plan: Continue per plan of care        Daily Treatment Diary    There ex: 10 min  Bike L2 10 min  BL Leg Press: 100# 2 x 10   Semi-Prone Quad Stretch: 3 x 30" each  Nino LE stretching  [place] Split Squat: 2 x 10 each 15#    NM: 10 min  Stationary lunges 1x10 each requiring PT verbal cues and demonstration  SLS Deadlift cone  2 x 10- PT verbal cues and demonstration  TB side stepping with squats: GTB 40' x2 & 10# DB      Manual 10 min   IASTM L/R quad/patellar tendon  PROM to increase knee flexion  Patellar mobs    Modalities:Pre mod stim with CP x 10 min skin in tact pre and post     Not today:  Squats 2 x 10 with 25lb DB- not today  Step ups with SL balance 2 x 10 10# DB OH

## 2019-08-29 ENCOUNTER — OFFICE VISIT (OUTPATIENT)
Dept: PHYSICAL THERAPY | Facility: CLINIC | Age: 24
End: 2019-08-29
Payer: COMMERCIAL

## 2019-08-29 DIAGNOSIS — S83.282D TEAR OF LATERAL MENISCUS OF LEFT KNEE, CURRENT, UNSPECIFIED TEAR TYPE, SUBSEQUENT ENCOUNTER: Primary | ICD-10-CM

## 2019-08-29 PROCEDURE — 97112 NEUROMUSCULAR REEDUCATION: CPT

## 2019-08-29 PROCEDURE — 97110 THERAPEUTIC EXERCISES: CPT

## 2019-08-29 PROCEDURE — 97014 ELECTRIC STIMULATION THERAPY: CPT

## 2019-08-29 PROCEDURE — 97140 MANUAL THERAPY 1/> REGIONS: CPT

## 2019-09-03 ENCOUNTER — OFFICE VISIT (OUTPATIENT)
Dept: PHYSICAL THERAPY | Facility: CLINIC | Age: 24
End: 2019-09-03
Payer: COMMERCIAL

## 2019-09-03 DIAGNOSIS — S83.282D TEAR OF LATERAL MENISCUS OF LEFT KNEE, CURRENT, UNSPECIFIED TEAR TYPE, SUBSEQUENT ENCOUNTER: Primary | ICD-10-CM

## 2019-09-03 PROCEDURE — 97112 NEUROMUSCULAR REEDUCATION: CPT

## 2019-09-03 PROCEDURE — 97140 MANUAL THERAPY 1/> REGIONS: CPT

## 2019-09-03 PROCEDURE — 97110 THERAPEUTIC EXERCISES: CPT

## 2019-09-03 NOTE — PROGRESS NOTES
Daily Note     Today's date: 9/3/2019  Patient name: Shahida Hidalgo  : 1995  MRN: [id number]  Referring provider: Marina Allen MD  Dx:   Encounter Diagnosis     ICD-10-CM    1  Tear of lateral meniscus of left knee, current, unspecified tear type, subsequent encounter S86 414D                   Subjective: Patient reports that he continues to have painful popping in his R knee  Returning to work next week    Objective: See treatment diary below    Assessment: Tolerated treatment well  Plan: Continue per plan of care        Daily Treatment Diary    There ex: 15 min  Bike L2 10 min  BL Leg Press: 100# 2 x 10   Semi-Prone Quad Stretch: 3 x 30" each  Nino LE stretching  Squats 2 x 10 with 25lb DB- not today  Step ups with SL balance 2 x 10 10# DB OH    NM: 10 min  Stationary lunges 1x10 each requiring PT verbal cues and demonstration  SLS Deadlift cone  2 x 10- PT verbal cues and demonstration  TB side stepping with squats: GTB 40' x2 & 10# DB      Manual 10 min   ART bilateral quads/patellar tendons  PROM to increase knee flexion  Patellar mobs

## 2019-09-04 ENCOUNTER — OFFICE VISIT (OUTPATIENT)
Dept: PHYSICAL THERAPY | Facility: CLINIC | Age: 24
End: 2019-09-04
Payer: COMMERCIAL

## 2019-09-04 DIAGNOSIS — S83.282D TEAR OF LATERAL MENISCUS OF LEFT KNEE, CURRENT, UNSPECIFIED TEAR TYPE, SUBSEQUENT ENCOUNTER: Primary | ICD-10-CM

## 2019-09-04 PROCEDURE — 97110 THERAPEUTIC EXERCISES: CPT

## 2019-09-04 PROCEDURE — 97112 NEUROMUSCULAR REEDUCATION: CPT

## 2019-09-04 PROCEDURE — 97140 MANUAL THERAPY 1/> REGIONS: CPT

## 2019-09-04 NOTE — PROGRESS NOTES
Daily Note     Today's date: 2019  Patient name: Marzena Epps  : 1995  MRN: [id number]  Referring provider: Stafford Hodgkin, MD  Dx:   Encounter Diagnosis     ICD-10-CM    1  Tear of lateral meniscus of left knee, current, unspecified tear type, subsequent encounter S83 282D        Start Time: 1500  Stop Time: 1600  Total time in clinic (min): 60 minutes    Subjective: Patient reports he going back to work next week and "might be his last week this week"    Objective: See treatment diary below    Assessment: Tolerated treatment well  Pt reported he is prepared for discharge next session  Plan: Continue per plan of care        Daily Treatment Diary    There ex: 15 min  Rec Bike L2 10 min  BL Leg Press: 100# 2 x 10 1x 110#   Semi-Prone Quad Stretch: 3 x 30" each  Nino LE stretching  Step ups with SL balance 2 x 10 10# DB OH  Resisted Sidestepping: [place] 10# 10x    NM: 10 min  Lunges onto BOSU 2x10  SLS Deadlift 10# 2x10  TB side stepping with squats: GTB 40' x2 & 10# DB        Manual 10 min   ART bilateral quads/patellar tendons  PROM to increase knee flexion  Patellar mobs (not performed)    Modalities- B knees estim medial/lat 10'- skin intact pre/post                     Ice B knees concurrent with estim 10' skin intact pre/post

## 2019-09-05 ENCOUNTER — EVALUATION (OUTPATIENT)
Dept: PHYSICAL THERAPY | Facility: CLINIC | Age: 24
End: 2019-09-05
Payer: COMMERCIAL

## 2019-09-05 DIAGNOSIS — S83.282D TEAR OF LATERAL MENISCUS OF LEFT KNEE, CURRENT, UNSPECIFIED TEAR TYPE, SUBSEQUENT ENCOUNTER: Primary | ICD-10-CM

## 2019-09-05 PROCEDURE — 97110 THERAPEUTIC EXERCISES: CPT

## 2019-09-05 PROCEDURE — 97112 NEUROMUSCULAR REEDUCATION: CPT

## 2019-09-05 PROCEDURE — 97140 MANUAL THERAPY 1/> REGIONS: CPT

## 2019-09-05 NOTE — PROGRESS NOTES
PT Discharge    Today's date: 2019  Patient name: Marzena Epps  : 1995  MRN: [id number]  Referring provider: Stafford Hodgkin, MD  Dx:   Encounter Diagnosis     ICD-10-CM    1  Tear of lateral meniscus of left knee, current, unspecified tear type, subsequent encounter S83 282D                   Assessment  Assessment details: Pt has made significant progress and is Independent with HEP  Pt encouraged to use a fitness center to continue to improve strength  Pt is D/C to HEP at this time  Understanding of Dx/Px/POC: good   Prognosis: good    Goals  Short Term Goals: Target Date 30 days  1  Initiate and advance HEP- Achieved  2  Decrease c/o pain to 4/10 at worst-Achieved  3  Increase ROM to WNL- met  4  Increase strength by 1 grade- met    Long Term Goals: Target Date 90 days  1  Indep with HEP-Achieved  2  Abolish c/o pain - Partially Achieved  3  Increase strength to [facility]- Achieved      Plan  Planned therapy interventions: home exercise program  Treatment plan discussed with: patient        Subjective Evaluation    History of Present Illness  Date of surgery: 2019  Mechanism of injury: surgery  Mechanism of injury: PMH significant for Kidney Transplant, Hearing Loss  S/p left knee arthroscopy 19 due to left lateral meniscal tear    Pt reports left knee is feeling better than right knee   C/o popping and pain in right knee           Not a recurrent problem   Quality of life: good    Pain  Current pain ratin  At best pain ratin  At worst pain ratin  Location: left lateral knee  Quality: dull ache  Relieving factors: change in position  Progression: improved    Social Support  Steps to enter house: yes  Stairs in house: yes   Lives in: multiple-level home  Lives with: parents    Employment status: working  Patient Goals  Patient goals for therapy: increased strength, decreased pain and return to work  Patient goal: efficient at work        Objective  Hip  Left Right  Flexion  4/5 4/5  Extension 5/5 5/5  Abduction 4+/5 4+/5   Knee  Flexion  4+/5 4+/5  Extension 4+/5 4+/5     Left Knee AROM = WNL  Left Knee PROM = 0 to 145        Daily Treatment Diary      Manual Tx: 10 min  ART: Quads  TE's: 20 min  Upright Bike: L2 10'  Prone Quad Stretch: 6 x 10"  SL Leg Press: 90# 2 x 10   SL Deadlift 2 x12 15lbs  Step ups with 15 lbs 2 x 10

## 2019-09-09 ENCOUNTER — APPOINTMENT (OUTPATIENT)
Dept: PHYSICAL THERAPY | Facility: CLINIC | Age: 24
End: 2019-09-09
Payer: COMMERCIAL

## 2019-09-11 ENCOUNTER — APPOINTMENT (OUTPATIENT)
Dept: PHYSICAL THERAPY | Facility: CLINIC | Age: 24
End: 2019-09-11
Payer: COMMERCIAL

## 2019-09-12 ENCOUNTER — APPOINTMENT (OUTPATIENT)
Dept: PHYSICAL THERAPY | Facility: CLINIC | Age: 24
End: 2019-09-12
Payer: COMMERCIAL

## 2019-09-16 ENCOUNTER — APPOINTMENT (OUTPATIENT)
Dept: PHYSICAL THERAPY | Facility: CLINIC | Age: 24
End: 2019-09-16
Payer: COMMERCIAL

## 2019-09-18 ENCOUNTER — APPOINTMENT (OUTPATIENT)
Dept: PHYSICAL THERAPY | Facility: CLINIC | Age: 24
End: 2019-09-18
Payer: COMMERCIAL

## 2019-09-19 ENCOUNTER — APPOINTMENT (OUTPATIENT)
Dept: PHYSICAL THERAPY | Facility: CLINIC | Age: 24
End: 2019-09-19
Payer: COMMERCIAL

## 2019-09-23 ENCOUNTER — APPOINTMENT (OUTPATIENT)
Dept: PHYSICAL THERAPY | Facility: CLINIC | Age: 24
End: 2019-09-23
Payer: COMMERCIAL

## 2019-09-25 ENCOUNTER — APPOINTMENT (OUTPATIENT)
Dept: PHYSICAL THERAPY | Facility: CLINIC | Age: 24
End: 2019-09-25
Payer: COMMERCIAL

## 2019-09-26 ENCOUNTER — APPOINTMENT (OUTPATIENT)
Dept: PHYSICAL THERAPY | Facility: CLINIC | Age: 24
End: 2019-09-26
Payer: COMMERCIAL